# Patient Record
Sex: MALE | Race: OTHER | Employment: FULL TIME | URBAN - METROPOLITAN AREA
[De-identification: names, ages, dates, MRNs, and addresses within clinical notes are randomized per-mention and may not be internally consistent; named-entity substitution may affect disease eponyms.]

---

## 2023-12-23 ENCOUNTER — OFFICE VISIT (OUTPATIENT)
Dept: URGENT CARE | Facility: CLINIC | Age: 38
End: 2023-12-23
Payer: COMMERCIAL

## 2023-12-23 VITALS
TEMPERATURE: 97.9 F | HEART RATE: 75 BPM | WEIGHT: 146 LBS | RESPIRATION RATE: 20 BRPM | OXYGEN SATURATION: 100 % | BODY MASS INDEX: 26.87 KG/M2 | HEIGHT: 62 IN

## 2023-12-23 DIAGNOSIS — L72.3 SEBACEOUS CYST: Primary | ICD-10-CM

## 2023-12-23 PROCEDURE — 99213 OFFICE O/P EST LOW 20 MIN: CPT | Performed by: PHYSICIAN ASSISTANT

## 2023-12-23 NOTE — PATIENT INSTRUCTIONS
1.  Follow-up with dermatology as soon as possible.  2.  Return here or go to the ER immediately if the area becomes sore, red, quickly advances in size, or in any other way seems complicated.

## 2023-12-23 NOTE — PROGRESS NOTES
[facility]        NAME: Kanu Jose is a 38 y.o. male  : 1985    MRN: [id number]  DATE: 2023  TIME: 3:19 PM    Assessment and Plan   Sebaceous cyst [L72.3]  1. Sebaceous cyst  Ambulatory Referral to Dermatology            Patient Instructions     1.  Follow-up with dermatology as soon as possible.  2.  Return here or go to the ER immediately if the area becomes sore, red, quickly advances in size, or in any other way seems complicated.    Chief Complaint     Chief Complaint   Patient presents with    Cyst     Cyst on left side of face began 1 month ago has grown          History of Present Illness       38-year-old male patient with approximate 2-month history of persistent left facial mass which seems to have gotten subtly larger over the period.  Patient denies any pain, drainage, redness, tenderness or any other changes in that regard.        Review of Systems   Review of Systems   Constitutional:  Negative for chills and fever.   HENT:  Negative for ear pain and sore throat.    Eyes:  Negative for pain and visual disturbance.   Respiratory:  Negative for cough and shortness of breath.    Cardiovascular:  Negative for chest pain and palpitations.   Gastrointestinal:  Negative for abdominal pain and vomiting.   Genitourinary:  Negative for dysuria and hematuria.   Musculoskeletal:  Negative for arthralgias and back pain.   Skin:  Negative for color change and rash.   Neurological:  Negative for seizures and syncope.   All other systems reviewed and are negative.        Current Medications     No current outpatient medications on file.    Current Allergies     Allergies as of 2023    (No Known Allergies)            The following portions of the patient's history were reviewed and updated as appropriate: allergies, current medications, past family history, past medical history, past social history, past surgical history and problem list.     No past medical history on  "file.    No past surgical history on file.    No family history on file.      Medications have been verified.        Objective   Pulse 75   Temp 97.9 °F (36.6 °C)   Resp 20   Ht 5' 2\" (1.575 m)   Wt 66.2 kg (146 lb)   SpO2 100%   BMI 26.70 kg/m²        Physical Exam     Physical Exam  Vitals and nursing note reviewed.   Constitutional:       General: He is not in acute distress.     Appearance: Normal appearance. He is not ill-appearing.   HENT:      Head:      Comments: [place] sized area of cutaneous swelling, induration without tenderness, fluctuance, redness, warmth to the left facial cheek area.  No drainage noted.  No red streaking.     Nose: Nose normal.      Mouth/Throat:      Mouth: Mucous membranes are moist.      Pharynx: Oropharynx is clear.   Eyes:      Conjunctiva/sclera: Conjunctivae normal.      Pupils: Pupils are equal, round, and reactive to light.   Cardiovascular:      Rate and Rhythm: Normal rate and regular rhythm.      Pulses: Normal pulses.   Pulmonary:      Effort: Pulmonary effort is normal.      Breath sounds: Normal breath sounds.   Musculoskeletal:         General: Normal range of motion.      Cervical back: Normal range of motion and neck supple.   Skin:     General: Skin is warm and dry.   Neurological:      Mental Status: He is alert.   Psychiatric:         Mood and Affect: Mood normal.         Behavior: Behavior normal.                   "

## 2023-12-29 ENCOUNTER — TELEPHONE (OUTPATIENT)
Age: 38
End: 2023-12-29

## 2023-12-29 NOTE — TELEPHONE ENCOUNTER
Patient called again about getting an apt. Advised that one of our nurses would be calling to get him set up.

## 2024-01-02 ENCOUNTER — OFFICE VISIT (OUTPATIENT)
Dept: DERMATOLOGY | Facility: CLINIC | Age: 39
End: 2024-01-02
Payer: COMMERCIAL

## 2024-01-02 VITALS — BODY MASS INDEX: 27.12 KG/M2 | TEMPERATURE: 97.6 F | HEIGHT: 62 IN | WEIGHT: 147.4 LBS

## 2024-01-02 DIAGNOSIS — L21.9 SEBORRHEIC DERMATITIS: ICD-10-CM

## 2024-01-02 DIAGNOSIS — L72.3 SEBACEOUS CYST: Primary | ICD-10-CM

## 2024-01-02 DIAGNOSIS — L72.0 EPIDERMAL INCLUSION CYST: ICD-10-CM

## 2024-01-02 PROCEDURE — 99204 OFFICE O/P NEW MOD 45 MIN: CPT | Performed by: DERMATOLOGY

## 2024-01-02 RX ORDER — KETOCONAZOLE 20 MG/G
CREAM TOPICAL DAILY
Qty: 60 G | Refills: 6 | Status: SHIPPED | OUTPATIENT
Start: 2024-01-02

## 2024-01-02 RX ORDER — DOXYCYCLINE HYCLATE 100 MG/1
100 CAPSULE ORAL EVERY 12 HOURS SCHEDULED
Qty: 60 CAPSULE | Refills: 0 | Status: SHIPPED | OUTPATIENT
Start: 2024-01-02 | End: 2024-02-01

## 2024-01-02 NOTE — PROGRESS NOTES
"[facility] Note     Patient Name: Kanu Jose  Encounter Date: 01/02/2024     Have you been cared for by a [facility] Dermatologist in the last 3 years and, if so, which description applies to you?    NO.   I am considered a \"new\" patient and must complete all patient intake questions. I am MALE/not capable of bearing children.    REVIEW OF SYSTEMS:  Have you recently had or currently have any of the following? Recent fever or chills? No  Any non-healing wound? No   PAST MEDICAL HISTORY:  Have you personally ever had or currently have any of the following?  If \"YES,\" then please provide more detail. Skin cancer (such as Melanoma, Basal Cell Carcinoma, Squamous Cell Carcinoma?  No  Tuberculosis, HIV/AIDS, Hepatitis B or C: No  Radiation Treatment No   HISTORY OF IMMUNOSUPPRESSION:   Do you have a history of any of the following:  Systemic Immunosuppression such as Diabetes, Biologic or Immunotherapy, Chemotherapy, Organ Transplantation, Bone Marrow Transplantation?  No     Answering \"YES\" requires the addition of the dotphrase \"IMMUNOSUPPRESSED\" as the first diagnosis of the patient's visit.   FAMILY HISTORY:  Any \"first degree relatives\" (parent, brother, sister, or child) with the following?    Skin Cancer, Pancreatic or Other Cancer? No   PATIENT EXPERIENCE:    Do you want the Dermatologist to perform a COMPLETE skin exam today including a clinical examination under the \"bra and underwear\" areas?  NO  If necessary, do we have your permission to call and leave a detailed message on your Preferred Phone number that includes your specific medical information?  Yes      No Known Allergies   Current Outpatient Medications:   •  doxycycline hyclate (VIBRAMYCIN) 100 mg capsule, Take 1 capsule (100 mg total) by mouth every 12 (twelve) hours, Disp: 60 capsule, Rfl: 0  •  ketoconazole (NIZORAL) 2 % cream, Apply topically daily, Disp: 60 g, Rfl: 6          Whom besides the patient is providing " clinical information about today's encounter?   NO ADDITIONAL HISTORIAN (patient alone provided history)    Physical Exam and Assessment/Plan by Diagnosis:    EPIDERMAL INCLUSION CYST  Physical Exam:  Anatomic Location Affected:  Left cheek  Morphological Description:  Cystic papule with central punctum  Pertinent Positives:  Pertinent Negatives:    Additional History of Present Condition:  Reports lump has been present for about 2-3 months. States it loked like a pimple. Denies pain.     Assessment and Plan:  Based on a thorough discussion of this condition and the management approach to it (including a comprehensive discussion of the known risks, side effects and potential benefits of treatment), the patient (family) agrees to implement the following specific plan:  Discussed possible treatment methods including draining in office, antibiotics and removal by excision. An excision will leave a scar.  Start Doxycycline 100 mg twice daily. The risks of doxycycline were discussed at length including nausea/vomiting/diarrhea, abdominal pain, photosensitivity, esophagitis, and bacterial resistance. The patient was instructed to take the medication with a full meal and a glass of water, and not to lie down for 1 hour after taking.    Excision scheduled for 1/31/2024 at 8:40 at our [place] location.    SEBORRHEIC DERMATITIS     Physical Exam:  Anatomic Location Affected &  Morphological Description:  Greasy adherent scale/scaling plaques on the scalp, NLFs and b/l eyebrows.  Pertinent Positives:  Pertinent Negatives:    Additional History of Present Condition:  Noted on exam.    Assessment and Plan:  Based on a thorough discussion of this condition and the management approach to it (including a comprehensive discussion of the known risks, side effects and potential benefits of treatment), the patient (family) agrees to implement the following specific plan:          FACE    Use ketoconazole cream daily as a maintenance.  This is NOT a steroid, is safe for long-term everyday use. If you use this daily this will keep the rash on your face under control and help prevent flares of the flaking.      The chronic nature of this condition and association with normal skin yeast were reviewed. The goal of therapy is to control symptoms, but this is not typically something we cure and intermittent flares can be expected.    If severity merited by exam findings, topical steroids can be used for no longer than 1 week on face and 2 weeks on scalp (due to risks of atrophy, acne, etc) for significant flares for acute control, but azole therapy as above is the long term maintenance therapy of choice.     Seen by Pablo Rivas MD and attending          Scribe Attestation    I,:  Awa Anglin am acting as a scribe while in the presence of the attending physician.:       I,:  Sylvain Brody MD personally performed the services described in this documentation    as scribed in my presence.:

## 2024-01-02 NOTE — PATIENT INSTRUCTIONS
EPIDERMAL INCLUSION CYST      Assessment and Plan:  Based on a thorough discussion of this condition and the management approach to it (including a comprehensive discussion of the known risks, side effects and potential benefits of treatment), the patient (family) agrees to implement the following specific plan:  Discussed possible treatment methods including draining in office, antibiotics and removal by excision. An excision will leave a scar.  Start Doxycycline 100 mg twice daily. The risks of doxycycline were discussed at length including nausea/vomiting/diarrhea, abdominal pain, photosensitivity, esophagitis, and bacterial resistance. The patient was instructed to take the medication with a full meal and a glass of water, and not to lie down for 1 hour after taking.    Excision scheduled for 1/31/2024 at 8:40 at our [place] location.    SEBORRHEIC DERMATITIS       Assessment and Plan:  Based on a thorough discussion of this condition and the management approach to it (including a comprehensive discussion of the known risks, side effects and potential benefits of treatment), the patient (family) agrees to implement the following specific plan:          FACE    Use ketoconazole cream daily as a maintenance. This is NOT a steroid, is safe for long-term everyday use. If you use this daily this will keep the rash on your face under control and help prevent flares of the flaking.      The chronic nature of this condition and association with normal skin yeast were reviewed. The goal of therapy is to control symptoms, but this is not typically something we cure and intermittent flares can be expected.    If severity merited by exam findings, topical steroids can be used for no longer than 1 week on face and 2 weeks on scalp (due to risks of atrophy, acne, etc) for significant flares for acute control, but azole therapy as above is the long term maintenance therapy of choice.

## 2024-01-31 ENCOUNTER — PROCEDURE VISIT (OUTPATIENT)
Dept: DERMATOLOGY | Facility: CLINIC | Age: 39
End: 2024-01-31
Payer: COMMERCIAL

## 2024-01-31 VITALS — BODY MASS INDEX: 26.96 KG/M2 | HEIGHT: 62 IN | DIASTOLIC BLOOD PRESSURE: 64 MMHG | SYSTOLIC BLOOD PRESSURE: 118 MMHG

## 2024-01-31 DIAGNOSIS — L72.0 EPIDERMAL INCLUSION CYST: Primary | ICD-10-CM

## 2024-01-31 PROCEDURE — 88304 TISSUE EXAM BY PATHOLOGIST: CPT | Performed by: STUDENT IN AN ORGANIZED HEALTH CARE EDUCATION/TRAINING PROGRAM

## 2024-01-31 PROCEDURE — 11443 EXC FACE-MM B9+MARG 2.1-3 CM: CPT | Performed by: DERMATOLOGY

## 2024-01-31 PROCEDURE — 12052 INTMD RPR FACE/MM 2.6-5.0 CM: CPT | Performed by: DERMATOLOGY

## 2024-01-31 NOTE — PATIENT INSTRUCTIONS
POSTOP DISCUSSION DISCUSSION AND INSTRUCTIONS FOR PATIENT      Rationale for Procedure  A skin excision allows the dermatologist to remove a lesion. The procedure involves a local numbing medication and removing the entire lesion. Typically, the lesion is being removed because it is atypical, traumatized, or for significant appearance reasons.  The area will be open like a brush burn and allowed to heal.   There will be no sutures.Tissue is sent to pathologist who will reconfirm diagnosis and verify completeness of lesion removal.    Description of Procedure  We would like to perform a skin excision today.  A local anesthetic, similar to the kind that a dentist uses when filling a cavity, will be injected with a very small needle into the skin area to be sampled.  The injected skin and tissue underneath should “go to sleep” and become numbed so that no further pain should be felt.  A scalpel will be used to cut around the lesion and tissue will be submitted to pathology for examination.  Depending on the diagnosis the lesion will be excised with a certain amount of normal skin to help assure completeness of lesion removal.  The physician will discuss in advance the anticipated size and extent of removal.   Bleeding will occur, but it will stopped with direct pressure, sutures, and electrocautery.    Surgical “Vaseline-type” ointment will also applied after the procedure to help create a barrier between the wound and the outside world.      Risks and Potential Complications  The advantage of a skin excision is that it allows us to remove a problem lesion quickly.  Although this usually permits the lesion to heal as soon as possible with the least scarring, there are some risks and potential complications that include but are not limited to the following:  Some bleeding is normal at time of procedure and some bleeding on gauze is normal  the first few days after surgery.  Profuse bleeding and bleeding with swelling  and pain should be reported as detailed  below  Infection is uncommon in skin surgery.  Infection should be reported and is indicated by pain, redness, and discharge of purulent material.  Some dull to at time sharp pain could occur initially the day after surgery.  Persistent pain or increasing pain days after surgery is not expected and should be reported.  Every effort is made to minimize scar, but location, size, and genetics do play a role in scar appearance.  A surgical wound does not achieve its optimal appearance until 6 months.  There are several treatments available if scarring would be problematic including scar creams, silicone pad, laser and scar revision.  Skin discoloration can occur especially in people of color.  Its important to avoid sun on wound in first 6 months after surgery.  Treatment is available if pigment is problematic.  Incomplete removal of the lesion or recurrence of lesion can occur and this would then require further treatment and more surgery.  Nerve Damage/Numbness/Loss of Function is very rare, but is most likely to occur if lesion is large or if it is in a high risk location  Allergic Reaction to lidocaine is rare.  More commonly,  epinephrine is used  with the lidocaine.  Occasionally, epinephrine (aka adrenalin) may cause a brief  feeling of anxiety or jitteriness.  The person at the microscope  (pathologist) may provide additional information that was unexpected. This unexpected finding could provoke the need for additional treatment or evaluation.    What You Will Need to Do After the Procedure  Keep the area clean and dry the first day. Try NOT to remove the bandage for the first day.  Gently clean the area with soap and water and apply Vaseline ointment (this is over the counter and not a prescription) to the excision  site for up to 2 weeks.    Apply a clean appropriately sized bandage to area.  Gauze and paper tape are recommended for sensitive skin.  Return for suture  removal as instructed (generally 1 week for the face, 2 weeks for the body).  Take Acetaminophen (Tylenol) for discomfort, if no contraindications.  Do NOT take Ibuprofen or aspirin unless specifically told to do so by your Dermatologist because these medications can make bleeding worse.  Call our office immediately for signs of infection: fever, chills, increased redness, warmth, tenderness, discomfort/pain, or pus or foul smell coming from the wound.    If bleeding is noticed, place a clean cloth over the area and apply firm pressure for thirty minutes.  Check the wound ONLY after 30 minutes of direct pressure; do not cheat and sneak a peak, as that does not count.  If bleeding persists after 30 minutes of legitimate direct pressure, then try one more round of direct pressure for an additional 10 minutes to the area.  Should the bleeding become heavier or not stop after the second attempt, call [facility] directly at [phone] (SKIN) or, if after hours, go to your local Emergency Room/Emergency Department.

## 2024-01-31 NOTE — LETTER
January 31, 2024     Patient: Kanu Jose  YOB: 1985  Date of Visit: 1/31/2024      To Whom it May Concern:    Kanu Jose is under my professional care. Kanu was seen in my office on 1/31/2024. Kanu may return to work on 2/3/24 .    If you have any questions or concerns, please don't hesitate to call.         Sincerely,          Sylvain Brody MD

## 2024-01-31 NOTE — PROGRESS NOTES
"[facility] Note     Patient Name: Kanu Jose  Encounter Date: 1/31/24     Have you been cared for by a [facility] Dermatologist in the last 3 years and, if so, which description applies to you?    Yes.  I have been here within the last 3 years, and my medical history has NOT changed since that time.  I am MALE/not capable of bearing children.    REVIEW OF SYSTEMS:  Have you recently had or currently have any of the following? No changes in my recent health.   PAST MEDICAL HISTORY:  Have you personally ever had or currently have any of the following?  If \"YES,\" then please provide more detail. No changes in my medical history.   HISTORY OF IMMUNOSUPPRESSION: Do you have a history of any of the following:  Systemic Immunosuppression such as Diabetes, Biologic or Immunotherapy, Chemotherapy, Organ Transplantation, Bone Marrow Transplantation?  No     Answering \"YES\" requires the addition of the dotphrase \"IMMUNOSUPPRESSED\" as the first diagnosis of the patient's visit.   FAMILY HISTORY:  Any \"first degree relatives\" (parent, brother, sister, or child) with the following?    No changes in my family's known health.   PATIENT EXPERIENCE:    Do you want the Dermatologist to perform a COMPLETE skin exam today including a clinical examination under the \"bra and underwear\" areas?  NO  If necessary, do we have your permission to call and leave a detailed message on your Preferred Phone number that includes your specific medical information?  Yes      No Known Allergies   Current Outpatient Medications:   •  doxycycline hyclate (VIBRAMYCIN) 100 mg capsule, Take 1 capsule (100 mg total) by mouth every 12 (twelve) hours, Disp: 60 capsule, Rfl: 0  •  ketoconazole (NIZORAL) 2 % cream, Apply topically daily, Disp: 60 g, Rfl: 6          Whom besides the patient is providing clinical information about today's encounter?   NO ADDITIONAL HISTORIAN (patient alone provided history)    Physical Exam and " Assessment/Plan by Diagnosis:    PROCEDURE:  EXCISION WITH INTERMEDIATE LAYERED CLOSURE     Attending: Dr.Senft and Dr Theodore  Assistant:  GOLDIE Islas    Pre-Op Diagnosis: Epidermal cyst  Post-Op Diagnosis: Same         Lesion Anatomic Location: Left cheek   Pre-op size: 3.0 cm x 2.8 cm   Size of defect:  3.0 cm x 2.8 cm    Final repaired wound length:  2.8 cm    Written and verbal, witnessed informed consent was obtained. I discussed that excision is a method of removing lesions both benign and malignant lesions.  A portion of normal skin is often taken to ensure completeness of removal.  I reviewed that procedure will include numbing the area,  cutting around and under defect, undermining tissue, and closing the wound with sutures both inside and out.  These sutures are usually removed in 7 to 14 days. Risks (bleeding, pain, infection, scarring, recurrence) and benefits discussed. It was discussed with patient that every effort is made to minimize scar, but scarring is influenced also by extrinsic factor such as location, age and genetics.     Time Out: performed:  yes  Correct patient: yes.       LOCAL ANESTHESIA: 3:1 1% xylocaine with epi and 1-100,000 buffered    DESCRIPTION OF PROCEDURE: The patient was brought back into the procedure room, anesthetized locally, prepped and draped in the usual fashion. Using a #15 blade with a scalpel, the lesion was excised in elliptical fashion. The wound was  undermined in the  fascial plane. Hemostasis was achieved with light electrocoagulation. Purpose of undermining was to decrease wound tension and facilitate closure.    The wound was closed with subcutaneous sutures as follows:    Deep suture:5-0 Vicryl      Epidermal edge closure was accomplished with superficial sutures as follows:    Superficial suture: 6-0 Ethilon and 6 - 0 prolene  Superficial suture type: simple interrupted    Estimated blood loss less than 3ml.    The patient tolerated the procedure well  without any complications. The wound was cleaned with sterile saline, dried off, surgical vaseline ointment was applied, and the wound was covered. A pressure dressing was applied for stabilization and light pressure. The patient was given detailed oral and written instructions on postoperative care as detailed in consent. The patient left in good medical condition.    POSTOP DISCUSSION DISCUSSION AND INSTRUCTIONS FOR PATIENT      Rationale for Procedure  A skin excision allows the dermatologist to remove a lesion. The procedure involves a local numbing medication and removing the entire lesion. Typically, the lesion is being removed because it is atypical, traumatized, or for significant appearance reasons.  The area will be open like a brush burn and allowed to heal.   There will be no sutures.Tissue is sent to pathologist who will reconfirm diagnosis and verify completeness of lesion removal.    Description of Procedure  We would like to perform a skin excision today.  A local anesthetic, similar to the kind that a dentist uses when filling a cavity, will be injected with a very small needle into the skin area to be sampled.  The injected skin and tissue underneath should “go to sleep” and become numbed so that no further pain should be felt.  A scalpel will be used to cut around the lesion and tissue will be submitted to pathology for examination.  Depending on the diagnosis the lesion will be excised with a certain amount of normal skin to help assure completeness of lesion removal.  The physician will discuss in advance the anticipated size and extent of removal.   Bleeding will occur, but it will stopped with direct pressure, sutures, and electrocautery.    Surgical “Vaseline-type” ointment will also applied after the procedure to help create a barrier between the wound and the outside world.      Risks and Potential Complications  The advantage of a skin excision is that it allows us to remove a problem  lesion quickly.  Although this usually permits the lesion to heal as soon as possible with the least scarring, there are some risks and potential complications that include but are not limited to the following:  Some bleeding is normal at time of procedure and some bleeding on gauze is normal  the first few days after surgery.  Profuse bleeding and bleeding with swelling and pain should be reported as detailed  below  Infection is uncommon in skin surgery.  Infection should be reported and is indicated by pain, redness, and discharge of purulent material.  Some dull to at time sharp pain could occur initially the day after surgery.  Persistent pain or increasing pain days after surgery is not expected and should be reported.  Every effort is made to minimize scar, but location, size, and genetics do play a role in scar appearance.  A surgical wound does not achieve its optimal appearance until 6 months.  There are several treatments available if scarring would be problematic including scar creams, silicone pad, laser and scar revision.  Skin discoloration can occur especially in people of color.  Its important to avoid sun on wound in first 6 months after surgery.  Treatment is available if pigment is problematic.  Incomplete removal of the lesion or recurrence of lesion can occur and this would then require further treatment and more surgery.  Nerve Damage/Numbness/Loss of Function is very rare, but is most likely to occur if lesion is large or if it is in a high risk location  Allergic Reaction to lidocaine is rare.  More commonly,  epinephrine is used  with the lidocaine.  Occasionally, epinephrine (aka adrenalin) may cause a brief  feeling of anxiety or jitteriness.  The person at the microscope  (pathologist) may provide additional information that was unexpected. This unexpected finding could provoke the need for additional treatment or evaluation.    What You Will Need to Do After the Procedure  Keep the area  clean and dry the first day. Try NOT to remove the bandage for the first day.  Gently clean the area with soap and water and apply Vaseline ointment (this is over the counter and not a prescription) to the excision  site for up to 2 weeks.    Apply a clean appropriately sized bandage to area.  Gauze and paper tape are recommended for sensitive skin.  Return for suture removal as instructed (generally 1 week for the face, 2 weeks for the body).  Take Acetaminophen (Tylenol) for discomfort, if no contraindications.  Do NOT take Ibuprofen or aspirin unless specifically told to do so by your Dermatologist because these medications can make bleeding worse.  Call our office immediately for signs of infection: fever, chills, increased redness, warmth, tenderness, discomfort/pain, or pus or foul smell coming from the wound.    If bleeding is noticed, place a clean cloth over the area and apply firm pressure for thirty minutes.  Check the wound ONLY after 30 minutes of direct pressure; do not cheat and sneak a peak, as that does not count.  If bleeding persists after 30 minutes of legitimate direct pressure, then try one more round of direct pressure for an additional 10 minutes to the area.  Should the bleeding become heavier or not stop after the second attempt, call [facility] directly at [phone] (SKIN) or, if after hours, go to your local Emergency Room/Emergency Department.             Scribe Attestation    I,:  Mariel Louis MA am acting as a scribe while in the presence of the attending physician.:       I,:  Sylvain Brody MD personally performed the services described in this documentation    as scribed in my presence.:          Flory Theodore  PGY3 Dermatology Resident

## 2024-02-01 ENCOUNTER — OFFICE VISIT (OUTPATIENT)
Dept: FAMILY MEDICINE CLINIC | Facility: CLINIC | Age: 39
End: 2024-02-01
Payer: COMMERCIAL

## 2024-02-01 VITALS
HEIGHT: 63 IN | RESPIRATION RATE: 18 BRPM | TEMPERATURE: 97.7 F | BODY MASS INDEX: 25.48 KG/M2 | WEIGHT: 143.8 LBS | OXYGEN SATURATION: 98 % | HEART RATE: 100 BPM | SYSTOLIC BLOOD PRESSURE: 110 MMHG | DIASTOLIC BLOOD PRESSURE: 78 MMHG

## 2024-02-01 DIAGNOSIS — Z13.1 SCREENING FOR DIABETES MELLITUS: ICD-10-CM

## 2024-02-01 DIAGNOSIS — Z00.00 ENCOUNTER FOR MEDICAL EXAMINATION TO ESTABLISH CARE: ICD-10-CM

## 2024-02-01 DIAGNOSIS — Z13.220 SCREENING FOR LIPID DISORDERS: ICD-10-CM

## 2024-02-01 DIAGNOSIS — E66.3 OVERWEIGHT (BMI 25.0-29.9): ICD-10-CM

## 2024-02-01 DIAGNOSIS — Z83.49 FAMILY HISTORY OF THYROID DISEASE: ICD-10-CM

## 2024-02-01 DIAGNOSIS — Z11.59 NEED FOR HEPATITIS C SCREENING TEST: ICD-10-CM

## 2024-02-01 DIAGNOSIS — Z00.00 ANNUAL PHYSICAL EXAM: Primary | ICD-10-CM

## 2024-02-01 DIAGNOSIS — Z13.6 SCREENING FOR CARDIOVASCULAR CONDITION: ICD-10-CM

## 2024-02-01 DIAGNOSIS — Z83.2 FAMILY HISTORY OF IRON DEFICIENCY ANEMIA: ICD-10-CM

## 2024-02-01 DIAGNOSIS — R53.83 FATIGUE, UNSPECIFIED TYPE: ICD-10-CM

## 2024-02-01 DIAGNOSIS — Z11.4 SCREENING FOR HIV (HUMAN IMMUNODEFICIENCY VIRUS): ICD-10-CM

## 2024-02-01 PROCEDURE — 99385 PREV VISIT NEW AGE 18-39: CPT | Performed by: FAMILY MEDICINE

## 2024-02-01 NOTE — PROGRESS NOTES
ADULT ANNUAL PHYSICAL  [facility]    NAME: Kanu Jose  AGE: 38 y.o. SEX: male  : 1985     DATE: 2024     Assessment and Plan:     Problem List Items Addressed This Visit    None  Visit Diagnoses       Annual physical exam    -  Primary    Encounter for medical examination to establish care        Need for hepatitis C screening test        Relevant Orders    Hepatitis C Antibody    Screening for HIV (human immunodeficiency virus)        Relevant Orders    HIV 1/2 AG/AB w Reflex [facility] for 2 yr old and above    Screening for diabetes mellitus        Relevant Orders    Hemoglobin A1C    Screening for lipid disorders        Relevant Orders    Lipid panel    Overweight (BMI 25.0-29.9)        Relevant Orders    Lipid panel    Hemoglobin A1C    Comprehensive metabolic panel    Fatigue, unspecified type        Relevant Orders    Iron Panel (Includes Ferritin, Iron Sat%, Iron, and TIBC)    TSH, 3rd generation with Free T4 reflex    CBC and differential    Family history of thyroid disease        Relevant Orders    TSH, 3rd generation with Free T4 reflex    Family history of iron deficiency anemia        Relevant Orders    Iron Panel (Includes Ferritin, Iron Sat%, Iron, and TIBC)    CBC and differential    Screening for cardiovascular condition        Relevant Orders    CBC and differential            Immunizations and preventive care screenings were discussed with patient today. Appropriate education was printed on patient's after visit summary.    Counseling:  Alcohol/drug use: discussed moderation in alcohol intake, the recommendations for healthy alcohol use, and avoidance of illicit drug use.  Dental Health: discussed importance of regular tooth brushing, flossing, and dental visits.  Injury prevention: discussed safety/seat belts, safety helmets, smoke detectors, carbon dioxide detectors, and smoking near bedding or upholstery.  Sexual health:  discussed sexually transmitted diseases, partner selection, use of condoms, avoidance of unintended pregnancy, and contraceptive alternatives.  Exercise: the importance of regular exercise/physical activity was discussed. Recommend exercise 3-5 times per week for at least 30 minutes.          No follow-ups on file.     Chief Complaint:     Chief Complaint   Patient presents with    Establish Care     Would like physical and blood work up      History of Present Illness:     Adult Annual Physical   Patient here for a comprehensive physical exam. The patient reports no problems.  He has not seen a doctor for about 10 years. He states he had scoliosis and some other issues when he was growing up. He also had digestive issues and pulmonary issues but does not remember exactly what. He had PFTs done and was using inhalers but does not require them anymore.   He wants a comprehensive physical. He states he his more tired recently and winded after walking up stairs. He thinks his mom has thyroid disease, sister has iron deficiency anemia.   Patient did have a cyst removed from his left cheek yesterday.     Diet and Physical Activity  Diet/Nutrition: poor diet, frequent junk food, limited fruits/vegetables, and mainly fast food .   Exercise: no formal exercise.      Depression Screening  PHQ-2/9 Depression Screening    Little interest or pleasure in doing things: 1 - several days  Feeling down, depressed, or hopeless: 0 - not at all  Trouble falling or staying asleep, or sleeping too much: 1 - several days  Feeling tired or having little energy: 0 - not at all  Poor appetite or overeatin - not at all  Feeling bad about yourself - or that you are a failure or have let yourself or your family down: 0 - not at all  Trouble concentrating on things, such as reading the newspaper or watching television: 0 - not at all  Moving or speaking so slowly that other people could have noticed. Or the opposite - being so fidgety or  restless that you have been moving around a lot more than usual: 0 - not at all  Thoughts that you would be better off dead, or of hurting yourself in some way: 0 - not at all  PHQ-2 Score: 1  PHQ-2 Interpretation: Negative depression screen  PHQ-9 Score: 2  PHQ-9 Interpretation: No or Minimal depression       General Health  Sleep: sleeps well, gets 7-8 hours of sleep on average, and feels groggy waking up .   Hearing: normal - bilateral.  Vision: most recent eye exam >1 year ago, wears glasses, and is due for eye exam and planning to schedule .   Dental: no dental visits for >1 year, brushes teeth twice daily, and does not floss.        Health  History of STDs?: no.    Advanced Care Planning  Do you have an advanced directive? no  Do you have a durable medical power of ? no     Review of Systems:     Review of Systems   Constitutional:  Positive for fatigue. Negative for chills and fever.   HENT:  Negative for congestion, sinus pressure, sinus pain and sore throat.    Eyes:  Negative for pain, redness and itching.   Respiratory:  Negative for cough, chest tightness and shortness of breath.    Cardiovascular:  Negative for chest pain and palpitations.   Gastrointestinal:  Negative for abdominal pain, constipation, diarrhea, nausea and vomiting.   Genitourinary:  Negative for difficulty urinating, dysuria and hematuria.   Musculoskeletal:  Negative for arthralgias, back pain and myalgias.   Skin:  Positive for wound (cyst on right cheek removed, covered with bandage). Negative for color change, pallor and rash.   Neurological:  Negative for dizziness, light-headedness and headaches.   All other systems reviewed and are negative.     Past Medical History:     History reviewed. No pertinent past medical history.   Past Surgical History:     History reviewed. No pertinent surgical history.   Social History:     Social History     Socioeconomic History    Marital status: Single     Spouse name: None    Number  "of children: None    Years of education: None    Highest education level: None   Occupational History    None   Tobacco Use    Smoking status: Some Days     Current packs/day: 0.25     Average packs/day: 0.3 packs/day for 17.1 years (4.3 ttl pk-yrs)     Types: Cigarettes     Start date: 2007    Smokeless tobacco: Never   Vaping Use    Vaping status: Some Days    Substances: Nicotine, CBD   Substance and Sexual Activity    Alcohol use: Yes     Comment: socially    Drug use: Never    Sexual activity: None   Other Topics Concern    None   Social History Narrative    None     Social Determinants of Health     Financial Resource Strain: Not on file   Food Insecurity: Not on file   Transportation Needs: Not on file   Physical Activity: Not on file   Stress: Not on file   Social Connections: Not on file   Intimate Partner Violence: Not on file   Housing Stability: Not on file      Family History:     History reviewed. No pertinent family history.   Current Medications:     Current Outpatient Medications   Medication Sig Dispense Refill    doxycycline hyclate (VIBRAMYCIN) 100 mg capsule Take 1 capsule (100 mg total) by mouth every 12 (twelve) hours 60 capsule 0    ketoconazole (NIZORAL) 2 % cream Apply topically daily 60 g 6     No current facility-administered medications for this visit.      Allergies:     Allergies   Allergen Reactions    Seasonal Ic [Octacosanol] Nasal Congestion      Physical Exam:     /78 (BP Location: Left arm, Patient Position: Sitting, Cuff Size: Adult)   Pulse 100   Temp 97.7 °F (36.5 °C) (Tympanic)   Resp 18   Ht 5' 3\" (1.6 m)   Wt 65.2 kg (143 lb 12.8 oz)   SpO2 98%   BMI 25.47 kg/m²     Physical Exam  Constitutional:       Appearance: He is overweight.   HENT:      Head: Normocephalic and atraumatic.      Right Ear: Tympanic membrane, ear canal and external ear normal.      Left Ear: Tympanic membrane, ear canal and external ear normal.      Nose: Nose normal.      Mouth/Throat:    "   Mouth: Mucous membranes are moist.      Pharynx: Oropharynx is clear. No oropharyngeal exudate or posterior oropharyngeal erythema.   Eyes:      General: No scleral icterus.     Extraocular Movements: Extraocular movements intact.      Conjunctiva/sclera: Conjunctivae normal.      Pupils: Pupils are equal, round, and reactive to light.   Cardiovascular:      Rate and Rhythm: Normal rate and regular rhythm.      Heart sounds: Normal heart sounds. No murmur heard.  Pulmonary:      Effort: Pulmonary effort is normal. No respiratory distress.      Breath sounds: Normal breath sounds. No wheezing.   Abdominal:      General: Abdomen is flat. Bowel sounds are normal.      Palpations: Abdomen is soft. There is no mass.      Tenderness: There is no abdominal tenderness.   Musculoskeletal:         General: Normal range of motion.      Cervical back: Normal range of motion and neck supple.      Right lower leg: No edema.      Left lower leg: No edema.   Skin:     General: Skin is warm.      Findings: Lesion (cyst on right cheek, removed on 1/31, coverd with bandage) present.   Neurological:      Mental Status: He is alert and oriented to person, place, and time.      Cranial Nerves: No cranial nerve deficit.      Sensory: No sensory deficit.      Motor: No weakness.      Coordination: Coordination normal.      Gait: Gait normal.   Psychiatric:         Mood and Affect: Mood normal.         Behavior: Behavior normal.          Soha Araujo DO   [facility]

## 2024-02-05 PROCEDURE — 88304 TISSUE EXAM BY PATHOLOGIST: CPT | Performed by: STUDENT IN AN ORGANIZED HEALTH CARE EDUCATION/TRAINING PROGRAM

## 2024-02-06 NOTE — RESULT ENCOUNTER NOTE
DERMATOPATHOLOGY RESULT NOTE    Results reviewed by ordering physician.  Called patient to personally discuss results. Phone number did not work x 3 calls. Patient has [email or web address] message and last viewed 2/1/24. Messaged him results on [employer] and will confirm his number. Biopsy showed EIC.       Instructions for Clinical Derm Team:   (remember to route Result Note to appropriate staff):    None    Result & Plan by Specimen:    Specimen A: benign  Plan: reassured, benign.     Tissue Exam: [id number]  Order: [id number]  Status: Final result      Visible to patient: Yes (not seen)      Dx: Epidermal inclusion cyst    0 Result Notes     Component   Case Report  Surgical Pathology Report                         Case: [id number]                                  Authorizing Provider:  Flory Theodore MD            Collected:           01/31/2024 1056              Ordering Location:     [facility]      Received:            01/31/2024 [address] [place] [address]                                                                      Pathologist:           Becky Ross MD                                                          Specimen:    Skin, Other, Specimen A Left cheek                                                      Final Diagnosis  A. Skin, left cheek:    EPIDERMAL INCLUSION CYST.      Electronically signed by Becky Ross MD on 2/5/2024 at  4:14 PM

## 2024-02-15 ENCOUNTER — OFFICE VISIT (OUTPATIENT)
Dept: DERMATOLOGY | Facility: CLINIC | Age: 39
End: 2024-02-15

## 2024-02-15 DIAGNOSIS — Z48.02 ENCOUNTER FOR REMOVAL OF SUTURES: Primary | ICD-10-CM

## 2024-02-15 PROCEDURE — RECHECK: Performed by: DERMATOLOGY

## 2024-02-15 NOTE — PROGRESS NOTES
"Suture removal    Date/Time: 2/15/2024 10:23 AM    Performed by: Naldo Collier  Authorized by: Cuate Gordon MD  Universal Protocol:  Consent: Verbal consent obtained. Written consent not obtained.  Risks and benefits: risks, benefits and alternatives were discussed  Time out: Immediately prior to procedure a \"time out\" was called to verify the correct patient, procedure, equipment, support staff and site/side marked as required.  Timeout called at: 2/15/2024 10:15 AM.  Patient understanding: patient states understanding of the procedure being performed  Patient consent: the patient's understanding of the procedure matches consent given  Procedure consent: procedure consent matches procedure scheduled  Relevant documents: relevant documents not present or verified  Test results: test results not available  Site marked: the operative site was not marked  Radiology Images displayed and confirmed. If images not available, report reviewed: imaging studies not available  Patient identity confirmed: verbally with patient      Patient location:  Clinic  Location:     Laterality:  Left    Location:  Head/neck    Head/neck location:  Cheek    Cheek location:  L cheek  Procedure details:     Tools used:  Suture removal kit and scalpel    Wound appearance:  No sign(s) of infection and good wound healing    Number of sutures removed:  8  Post-procedure details:     Post-removal:  No dressing applied (Vaseline)    Patient tolerance of procedure:  Tolerated well, no immediate complications  Comments:      Patient was encouraged to continue to clean and care for the wound until fully healed. Patient was encourage to continue to follow up for regular full body skin exams as scheduled.             Naldo Collier"

## 2024-02-28 ENCOUNTER — TELEPHONE (OUTPATIENT)
Dept: FAMILY MEDICINE CLINIC | Facility: CLINIC | Age: 39
End: 2024-02-28

## 2024-02-28 NOTE — PROGRESS NOTES
[facility] Office visit    Assessment/Plan:     1. Elevated LDL cholesterol level    Reviewed lab results with the patient. Noted elevated  and ALT 60s. Reviewed different diet to lower cholesterol and LDL.    BMI Counseling: Body mass index is 25.69 kg/m². The BMI is above normal. Nutrition recommendations include reducing portion sizes, 3-5 servings of fruits/vegetables daily, reducing fast food intake, consuming healthier snacks, moderation in carbohydrate intake, increasing intake of lean protein, reducing intake of saturated fat and trans fat, and reducing intake of cholesterol. Exercise recommendations include moderate aerobic physical activity for 150 minutes/week, exercising 3-5 times per week, joining a gym, and strength training exercises.      Return if symptoms worsen or fail to improve.     Subjective:     Kanu Jose is a 38 y.o. male presents today for lab result review. Denied any acute symptoms. He is eating out almost every day and wants to gain some weight.  Currently planning to go back to working out. Hx of scoliosis and lung infection in childhood that required lung tissue removal? Pending medical records from prior practice.      Review of Systems   Constitutional:  Negative for chills and fever.   HENT:  Negative for ear pain and sore throat.    Eyes:  Negative for pain and visual disturbance.   Respiratory:  Negative for cough and shortness of breath.    Cardiovascular:  Negative for chest pain and palpitations.   Gastrointestinal:  Negative for abdominal pain and vomiting.   Genitourinary:  Negative for dysuria and hematuria.   Musculoskeletal:  Negative for arthralgias and back pain.   Skin:  Negative for color change and rash.   Neurological:  Negative for seizures and syncope.   All other systems reviewed and are negative.       Objective:     /72   Pulse 94   Resp 18   Wt 65.8 kg (145 lb)   SpO2 99%   BMI 25.69 kg/m²      Physical Exam  Vitals  reviewed.   Constitutional:       General: He is not in acute distress.     Appearance: Normal appearance. He is not ill-appearing.   HENT:      Head: Normocephalic and atraumatic.   Cardiovascular:      Rate and Rhythm: Normal rate and regular rhythm.      Pulses: Normal pulses.      Heart sounds: Normal heart sounds. No murmur heard.  Pulmonary:      Effort: Pulmonary effort is normal. No respiratory distress.      Breath sounds: Normal breath sounds. No wheezing.   Musculoskeletal:         General: Normal range of motion.   Skin:     General: Skin is warm and dry.   Neurological:      General: No focal deficit present.      Mental Status: He is alert and oriented to person, place, and time.   Psychiatric:         Mood and Affect: Mood normal.         Behavior: Behavior normal.          ** Please Note: This note has been constructed using a voice recognition system **     Satya Pedro MD  02/29/24  2:45 PM

## 2024-02-29 ENCOUNTER — OFFICE VISIT (OUTPATIENT)
Dept: FAMILY MEDICINE CLINIC | Facility: CLINIC | Age: 39
End: 2024-02-29
Payer: COMMERCIAL

## 2024-02-29 VITALS
HEART RATE: 94 BPM | RESPIRATION RATE: 18 BRPM | DIASTOLIC BLOOD PRESSURE: 72 MMHG | SYSTOLIC BLOOD PRESSURE: 110 MMHG | BODY MASS INDEX: 25.69 KG/M2 | WEIGHT: 145 LBS | OXYGEN SATURATION: 99 %

## 2024-02-29 DIAGNOSIS — E78.00 ELEVATED LDL CHOLESTEROL LEVEL: Primary | ICD-10-CM

## 2024-02-29 PROCEDURE — 99213 OFFICE O/P EST LOW 20 MIN: CPT | Performed by: FAMILY MEDICINE
